# Patient Record
Sex: FEMALE | Race: WHITE | Employment: OTHER | URBAN - METROPOLITAN AREA
[De-identification: names, ages, dates, MRNs, and addresses within clinical notes are randomized per-mention and may not be internally consistent; named-entity substitution may affect disease eponyms.]

---

## 2017-07-17 ENCOUNTER — TELEPHONE (OUTPATIENT)
Dept: FAMILY MEDICINE | Facility: CLINIC | Age: 82
End: 2017-07-17

## 2017-07-17 NOTE — TELEPHONE ENCOUNTER
Patient is wanting to come in either today or tomorrow to see Jayy Storey for some on and off dizziness. Can someone please call her to get her scheduled? Thank you!

## 2017-07-18 ENCOUNTER — OFFICE VISIT (OUTPATIENT)
Dept: FAMILY MEDICINE | Facility: CLINIC | Age: 82
End: 2017-07-18
Payer: MEDICARE

## 2017-07-18 VITALS
SYSTOLIC BLOOD PRESSURE: 126 MMHG | OXYGEN SATURATION: 98 % | BODY MASS INDEX: 34.31 KG/M2 | WEIGHT: 178.6 LBS | HEART RATE: 73 BPM | DIASTOLIC BLOOD PRESSURE: 60 MMHG | TEMPERATURE: 98 F

## 2017-07-18 DIAGNOSIS — R42 DIZZINESS: Primary | ICD-10-CM

## 2017-07-18 DIAGNOSIS — E11.22 TYPE 2 DIABETES MELLITUS WITH STAGE 1 CHRONIC KIDNEY DISEASE, WITHOUT LONG-TERM CURRENT USE OF INSULIN (H): ICD-10-CM

## 2017-07-18 DIAGNOSIS — R06.02 SOB (SHORTNESS OF BREATH): ICD-10-CM

## 2017-07-18 DIAGNOSIS — R51.9 NONINTRACTABLE EPISODIC HEADACHE, UNSPECIFIED HEADACHE TYPE: ICD-10-CM

## 2017-07-18 DIAGNOSIS — N18.1 TYPE 2 DIABETES MELLITUS WITH STAGE 1 CHRONIC KIDNEY DISEASE, WITHOUT LONG-TERM CURRENT USE OF INSULIN (H): ICD-10-CM

## 2017-07-18 DIAGNOSIS — I10 BENIGN ESSENTIAL HYPERTENSION: ICD-10-CM

## 2017-07-18 LAB
ALBUMIN SERPL-MCNC: 3.5 G/DL (ref 3.4–5)
ALP SERPL-CCNC: 78 U/L (ref 40–150)
ALT SERPL W P-5'-P-CCNC: 28 U/L (ref 0–50)
ANION GAP SERPL CALCULATED.3IONS-SCNC: 7 MMOL/L (ref 3–14)
AST SERPL W P-5'-P-CCNC: 15 U/L (ref 0–45)
BILIRUB SERPL-MCNC: 0.3 MG/DL (ref 0.2–1.3)
BUN SERPL-MCNC: 25 MG/DL (ref 7–30)
CALCIUM SERPL-MCNC: 9.4 MG/DL (ref 8.5–10.1)
CHLORIDE SERPL-SCNC: 103 MMOL/L (ref 94–109)
CO2 SERPL-SCNC: 29 MMOL/L (ref 20–32)
CREAT SERPL-MCNC: 0.99 MG/DL (ref 0.52–1.04)
ERYTHROCYTE [DISTWIDTH] IN BLOOD BY AUTOMATED COUNT: 12.8 % (ref 10–15)
GFR SERPL CREATININE-BSD FRML MDRD: 54 ML/MIN/1.7M2
GLUCOSE SERPL-MCNC: 141 MG/DL (ref 70–99)
HBA1C MFR BLD: 6.7 % (ref 4.3–6)
HCT VFR BLD AUTO: 38.4 % (ref 35–47)
HGB BLD-MCNC: 12.7 G/DL (ref 11.7–15.7)
MCH RBC QN AUTO: 30.2 PG (ref 26.5–33)
MCHC RBC AUTO-ENTMCNC: 33.1 G/DL (ref 31.5–36.5)
MCV RBC AUTO: 91 FL (ref 78–100)
PLATELET # BLD AUTO: 336 10E9/L (ref 150–450)
POTASSIUM SERPL-SCNC: 4.7 MMOL/L (ref 3.4–5.3)
PROT SERPL-MCNC: 6.9 G/DL (ref 6.8–8.8)
RBC # BLD AUTO: 4.2 10E12/L (ref 3.8–5.2)
SODIUM SERPL-SCNC: 139 MMOL/L (ref 133–144)
WBC # BLD AUTO: 8.5 10E9/L (ref 4–11)

## 2017-07-18 PROCEDURE — 93000 ELECTROCARDIOGRAM COMPLETE: CPT | Performed by: PHYSICIAN ASSISTANT

## 2017-07-18 PROCEDURE — 36415 COLL VENOUS BLD VENIPUNCTURE: CPT | Performed by: PHYSICIAN ASSISTANT

## 2017-07-18 PROCEDURE — 99214 OFFICE O/P EST MOD 30 MIN: CPT | Performed by: PHYSICIAN ASSISTANT

## 2017-07-18 PROCEDURE — 80053 COMPREHEN METABOLIC PANEL: CPT | Performed by: PHYSICIAN ASSISTANT

## 2017-07-18 PROCEDURE — 83036 HEMOGLOBIN GLYCOSYLATED A1C: CPT | Performed by: PHYSICIAN ASSISTANT

## 2017-07-18 PROCEDURE — 85027 COMPLETE CBC AUTOMATED: CPT | Performed by: PHYSICIAN ASSISTANT

## 2017-07-18 NOTE — LETTER
[facility] (formerly [facility])[3]  [address]  [place] [address]  [phone]        July 19, 2017      Alka Azul  [address]  [place] [address]        Dear Alka,      Your lab work from yesterday came back with in normal limits.    Results for orders placed or performed in visit on 07/18/17   Comprehensive metabolic panel   Result Value Ref Range    Sodium 139 133 - 144 mmol/L    Potassium 4.7 3.4 - 5.3 mmol/L    Chloride 103 94 - 109 mmol/L    Carbon Dioxide 29 20 - 32 mmol/L    Anion Gap 7 3 - 14 mmol/L    Glucose 141 (H) 70 - 99 mg/dL    Urea Nitrogen 25 7 - 30 mg/dL    Creatinine 0.99 0.52 - 1.04 mg/dL    GFR Estimate 54 (L) >60 mL/min/1.7m2    GFR Estimate If Black 65 >60 mL/min/1.7m2    Calcium 9.4 8.5 - 10.1 mg/dL    Bilirubin Total 0.3 0.2 - 1.3 mg/dL    Albumin 3.5 3.4 - 5.0 g/dL    Protein Total 6.9 6.8 - 8.8 g/dL    Alkaline Phosphatase 78 40 - 150 U/L    ALT 28 0 - 50 U/L    AST 15 0 - 45 U/L   CBC with platelets   Result Value Ref Range    WBC 8.5 4.0 - 11.0 10e9/L    RBC Count 4.20 3.8 - 5.2 10e12/L    Hemoglobin 12.7 11.7 - 15.7 g/dL    Hematocrit 38.4 35.0 - 47.0 %    MCV 91 78 - 100 fl    MCH 30.2 26.5 - 33.0 pg    MCHC 33.1 31.5 - 36.5 g/dL    RDW 12.8 10.0 - 15.0 %    Platelet Count 336 150 - 450 10e9/L   Hemoglobin A1c   Result Value Ref Range    Hemoglobin A1C 6.7 (H) 4.3 - 6.0 %     If you have any questions or concerns, please call myself or my nurse at [phone].    Sincerely,    Jayy Storey PA-C/cindy

## 2017-07-18 NOTE — NURSING NOTE
"Chief Complaint   Patient presents with     Dizziness       Initial /66  Pulse 73  Temp 98  F (36.7  C) (Tympanic)  Wt 178 lb 9.6 oz (81 kg)  SpO2 98%  BMI 34.31 kg/m2 Estimated body mass index is 34.31 kg/(m^2) as calculated from the following:    Height as of 8/25/16: 5' 0.5\" (1.537 m).    Weight as of this encounter: 178 lb 9.6 oz (81 kg).  Medication Reconciliation: complete       Jaleesa Francisco CMA      "

## 2017-07-18 NOTE — MR AVS SNAPSHOT
"              After Visit Summary   7/18/2017    Alka Azul    MRN: [id number]           Patient Information     Date Of Birth          1935        Visit Information        Provider Department      7/18/2017 3:00 PM Jayy Storey PA-C [facility]        Today's Diagnoses     Dizziness    -  1    Nonintractable episodic headache, unspecified headache type        Benign essential hypertension        Type 2 diabetes mellitus with stage 1 chronic kidney disease, without long-term current use of insulin (H)        SOB (shortness of breath)           Follow-ups after your visit        Future tests that were ordered for you today     Open Future Orders        Priority Expected Expires Ordered    MR Brain w/o & w Contrast Routine  7/18/2018 7/18/2017    Echocardiogram Complete Routine  7/18/2018 7/18/2017    Zio Patch Holter Routine  9/1/2017 7/18/2017            Who to contact     Normal or non-critical lab and imaging results will be communicated to you by Webchutneyhart, letter or phone within 4 business days after the clinic has received the results. If you do not hear from us within 7 days, please contact the clinic through Webchutneyhart or phone. If you have a critical or abnormal lab result, we will notify you by phone as soon as possible.  Submit refill requests through A[employer] or call your pharmacy and they will forward the refill request to us. Please allow 3 business days for your refill to be completed.          If you need to speak with a  for additional information , please call: [phone]             Additional Information About Your Visit        WebchutneyharPlasmonix Information     Amonix lets you send messages to your doctor, view your test results, renew your prescriptions, schedule appointments and more. To sign up, go to [email or web address] . Click on \"Log in\" on the left side of the screen, which will take you to the Welcome page. Then click on \"Sign up Now\" on the right side " of the page.     You will be asked to enter the access code listed below, as well as some personal information. Please follow the directions to create your username and password.     Your access code is: [email or web address]  Expires: 10/16/2017  4:12 PM     Your access code will  in 90 days. If you need help or a new code, please call your [place] clinic or [phone].        Care EveryWhere ID     This is your Care EveryWhere ID. This could be used by other organizations to access your [place] medical records  [id number]        Your Vitals Were     Pulse Temperature Pulse Oximetry BMI (Body Mass Index)          73 98  F (36.7  C) (Tympanic) 98% 34.31 kg/m2         Blood Pressure from Last 3 Encounters:   17 126/60   16 128/72   16 144/70    Weight from Last 3 Encounters:   17 178 lb 9.6 oz (81 kg)   16 184 lb (83.5 kg)   16 188 lb (85.3 kg)              We Performed the Following     CBC with platelets     Comprehensive metabolic panel     EKG 12-lead complete w/read - Clinics     Hemoglobin A1c        Primary Care Provider Office Phone # Fax #    Jayy Storey PA-C [phone]       [facility] [address] [place] [facility] [address]        Equal Access to Services     [facility] : Hadii aad ku hadasho Soomaali, waaxda luqadaha, qaybta kaalmada adeegyada, rosalva tyson haypb brooks . So [facility] [phone].    ATENCIÓN: Si habla español, tiene a reddy disposición servicios gratuitos de asistencia lingüística. Tre al [phone].    We comply with applicable federal civil rights laws and [place] laws. We do not discriminate on the basis of race, color, national origin, age, disability sex, sexual orientation or gender identity.            Thank you!     Thank you for choosing [facility]  for your care. Our goal is always to provide you with excellent care. Hearing back from our patients is one way we can continue to improve  our services. Please take a few minutes to complete the written survey that you may receive in the mail after your visit with us. Thank you!             Your Updated Medication List - Protect others around you: Learn how to safely use, store and throw away your medicines at www.disposemymeds.org.          This list is accurate as of: 7/18/17  4:13 PM.  Always use your most recent med list.                   Brand Name Dispense Instructions for use Diagnosis    aspirin 81 MG tablet      Take 81 mg by mouth daily        celeBREX 200 MG capsule   Generic drug:  celecoxib      Take 200 mg by mouth daily        COZAAR 100 MG tablet   Generic drug:  losartan      Take 100 mg by mouth daily        diltiazem 180 MG 24 hr capsule    DILACOR XR     Take 180 mg by mouth daily        hydrochlorothiazide 12.5 MG Tabs tablet     90 tablet    Take 1 tablet (12.5 mg) by mouth daily    Benign essential hypertension       Levothyroxine Sodium 112 MCG Caps      Take 112 mcg by mouth daily        nexIUM 40 MG CR capsule   Generic drug:  esomeprazole      Take 40 mg by mouth every morning (before breakfast) Take 30-60 minutes before eating.        ranitidine 300 MG tablet    ZANTAC    30 tablet    Take 1 tablet (300 mg) by mouth At Bedtime    Gastroesophageal reflux disease without esophagitis       sertraline 25 MG tablet    ZOLOFT     Take 25 mg by mouth daily        TRADJENTA 5 MG Tabs tablet   Generic drug:  linagliptin      Take 5 mg by mouth daily        XANAX 0.25 MG tablet   Generic drug:  ALPRAZolam      Take 0.25 mg by mouth 3 times daily as needed for anxiety

## 2017-07-18 NOTE — PROGRESS NOTES
SUBJECTIVE:                                                    Alka Azul is a 81 year old female who presents to clinic today for the following health issues:      Dizziness  Onset: 1 week ago     Description:   Do you feel faint:  no   Does it feel like the surroundings (bed, room) are moving: no   Unsteady/off balance: YES  Have you passed out or fallen: YES    Intensity: moderate    Progression of Symptoms:  same    Accompanying Signs & Symptoms:  Heart palpitations: no   Nausea, vomiting: no   Weakness in arms or legs: no   Fatigue: no   Vision or speech changes: no   Ringing in ears (Tinnitus): no   Hearing Loss: no     History:   Head trauma/concussion hx: no   Previous similar symptoms: no   Recent bleeding history: no     Precipitating factors:   Worse with activity or head movement: no   Any new medications (BP?): no   Alcohol/drug abuse/withdrawal: no     Alleviating factors:   Does staying in a fixed position give relief:  no     Therapies Tried and outcome: NA    Some ha's as well. Generalized. Mild. No blurred or double vision. No hearing changes. No true vertigo. Minimal nasal congestion.  No dysphagia or dysarthria. No u/l paresthesias or paresis. No palpitations . No profound chest pain/sob.   Recent NM stress test in may was normal.   Problem list and histories reviewed & adjusted, as indicated.  Additional history: as documented    Patient Active Problem List   Diagnosis     Benign essential hypertension     Type 2 diabetes mellitus with stage 1 chronic kidney disease (H)     Hypothyroidism due to Hashimoto's thyroiditis     RAMONA (generalized anxiety disorder)     No past surgical history on file.    Social History   Substance Use Topics     Smoking status: Never Smoker     Smokeless tobacco: Not on file     Alcohol use No     No family history on file.      Recent Labs   Lab Test  08/25/16   1134  08/01/16   1507   A1C   --   6.9*   CR  0.97   --    GFRESTIMATED  56*   --    GFRESTBLACK  67    --    POTASSIUM  4.2   --    TSH   --   1.38      BP Readings from Last 3 Encounters:   07/18/17 112/66   08/25/16 128/72   08/01/16 144/70    Wt Readings from Last 3 Encounters:   07/18/17 178 lb 9.6 oz (81 kg)   08/25/16 184 lb (83.5 kg)   08/01/16 188 lb (85.3 kg)                    Reviewed and updated as needed this visit by clinical staff  Tobacco  Allergies  Meds       Reviewed and updated as needed this visit by Provider         All other systems negative except as outline above  OBJECTIVE:  Eye exam - right eye normal lid, conjunctiva, cornea, pupil and fundus, left eye normal lid, conjunctiva, cornea, pupil and fundus.  Thyroid not palpable, not enlarged, no nodules detected.  ENT exam reveals - ENT exam normal, no neck nodes or sinus tenderness.  CHEST:chest clear to IPPA, no tachypnea, retractions or cyanosis and S1, S2 normal, no murmur, no gallop, rate regular.  Her disks are flat. Pupils equal, round, reactive to light. Extraocular movements full. Visual fields full. Face moves symmetrically. Tongue midline. Hearing mildly decreased to finger-rubbing at approximately 6-8 inches. Neck without bruits. CV: S1, S2. Motor strength 5/5. Reflexes were 2/4. Toe signs were downgoing. Normal position sense. Good finger-nose-finger and fine finger movement. Gait: she karla from a chair without difficulty and has a mildly broad-based gait.  Negative orthostats  Alka was seen today for dizziness.    Diagnoses and all orders for this visit:    Dizziness  -     Comprehensive metabolic panel  -     CBC with platelets  -     EKG 12-lead complete w/read - Clinics  -     Zio Patch Holter; Future  -     MR Brain w/o & w Contrast; Future    Nonintractable episodic headache, unspecified headache type    Benign essential hypertension  -     Comprehensive metabolic panel    Type 2 diabetes mellitus with stage 1 chronic kidney disease, without long-term current use of insulin (H)  -     Hemoglobin A1c    SOB (shortness of  breath)  -     Echocardiogram Complete; Future    All other systems negative except as outline above  work on lifestyle modification

## 2017-07-19 ENCOUNTER — ALLIED HEALTH/NURSE VISIT (OUTPATIENT)
Dept: CARDIOLOGY | Facility: CLINIC | Age: 82
End: 2017-07-19
Attending: PHYSICIAN ASSISTANT
Payer: MEDICARE

## 2017-07-19 DIAGNOSIS — R42 DIZZINESS: ICD-10-CM

## 2017-07-19 PROCEDURE — 0298T ZZC EXT ECG > 48HR TO 21 DAY REVIEW AND INTERPRETATN: CPT

## 2017-07-19 PROCEDURE — 0296T ZIO PATCH HOLTER: CPT

## 2017-07-19 NOTE — PROGRESS NOTES
Patient has been prescribed a ZioPatch holter for 14 days.  Patient was instructed regarding the indication, function, care and prompt return of the ZioPatch holter monitor. The monitor, with S/N [id number],  was placed on the patient with instructions regarding care of the skin, electrodes, and monitor, as well as documentation in the patient diary. Patient demonstrated understanding of this information and agreed to call [employer] with further questions or concerns.

## 2017-08-02 ENCOUNTER — RADIANT APPOINTMENT (OUTPATIENT)
Dept: CARDIOLOGY | Facility: CLINIC | Age: 82
End: 2017-08-02
Attending: PHYSICIAN ASSISTANT
Payer: MEDICARE

## 2017-08-02 ENCOUNTER — RADIANT APPOINTMENT (OUTPATIENT)
Dept: MRI IMAGING | Facility: CLINIC | Age: 82
End: 2017-08-02
Attending: PHYSICIAN ASSISTANT
Payer: MEDICARE

## 2017-08-02 DIAGNOSIS — R06.02 SOB (SHORTNESS OF BREATH): ICD-10-CM

## 2017-08-02 DIAGNOSIS — R42 DIZZINESS: ICD-10-CM

## 2017-08-02 PROCEDURE — 70553 MRI BRAIN STEM W/O & W/DYE: CPT | Performed by: RADIOLOGY

## 2017-08-02 PROCEDURE — A9585 GADOBUTROL INJECTION: HCPCS | Performed by: RADIOLOGY

## 2017-08-02 PROCEDURE — 93306 TTE W/DOPPLER COMPLETE: CPT

## 2017-08-02 RX ORDER — GADOBUTROL 604.72 MG/ML
7.5 INJECTION INTRAVENOUS ONCE
Status: COMPLETED | OUTPATIENT
Start: 2017-08-02 | End: 2017-08-02

## 2017-08-02 RX ADMIN — GADOBUTROL 7.5 ML: 604.72 INJECTION INTRAVENOUS at 15:01

## 2017-08-08 ENCOUNTER — TELEPHONE (OUTPATIENT)
Dept: FAMILY MEDICINE | Facility: CLINIC | Age: 82
End: 2017-08-08

## 2017-08-08 NOTE — TELEPHONE ENCOUNTER
Patient calling regarding the recent testing she had done - holter monitor, echo-cardiogram, MRI patient would like to discuss the results with Jayy Storey and what the next step is going to be. Please call to advise. Thank you

## 2017-08-10 NOTE — TELEPHONE ENCOUNTER
Reason for Call:  Request for results:    Name of test or procedure: Zio Patch/MRI/Echo    Date of test of procedure:     Location of the test or procedure: [place] to leave the result message on voice mail or with a family member? YES    Phone number Patient can be reached at:  Home number on file [phone] (home)    Additional comments:     Call taken on 8/10/2017 at 4:18 PM by Jocelyn Marinelli

## 2017-08-11 NOTE — TELEPHONE ENCOUNTER
I don't see her holter monitor results in the computer. Verify that this was done. I'd like oracio to see me early next week for a recheck and to go over her recent test results.  Schedule her for an appt with me

## 2017-08-14 NOTE — TELEPHONE ENCOUNTER
Patient returned call, she will be in the area tomorrow. Patient asking if Jayy can see her tomorrow( add on)? Please advise and will call patient.

## 2017-08-15 ENCOUNTER — OFFICE VISIT (OUTPATIENT)
Dept: FAMILY MEDICINE | Facility: CLINIC | Age: 82
End: 2017-08-15
Payer: MEDICARE

## 2017-08-15 VITALS
TEMPERATURE: 98 F | RESPIRATION RATE: 16 BRPM | DIASTOLIC BLOOD PRESSURE: 68 MMHG | OXYGEN SATURATION: 96 % | SYSTOLIC BLOOD PRESSURE: 114 MMHG | BODY MASS INDEX: 34.58 KG/M2 | WEIGHT: 180 LBS | HEART RATE: 64 BPM

## 2017-08-15 DIAGNOSIS — R90.82 WHITE MATTER ABNORMALITY ON MRI OF BRAIN: ICD-10-CM

## 2017-08-15 DIAGNOSIS — R42 DIZZINESS: ICD-10-CM

## 2017-08-15 DIAGNOSIS — R06.09 DOE (DYSPNEA ON EXERTION): Primary | ICD-10-CM

## 2017-08-15 LAB — NT-PROBNP SERPL-MCNC: 104 PG/ML (ref 0–450)

## 2017-08-15 PROCEDURE — 99214 OFFICE O/P EST MOD 30 MIN: CPT | Performed by: PHYSICIAN ASSISTANT

## 2017-08-15 PROCEDURE — 36415 COLL VENOUS BLD VENIPUNCTURE: CPT | Performed by: PHYSICIAN ASSISTANT

## 2017-08-15 PROCEDURE — 83880 ASSAY OF NATRIURETIC PEPTIDE: CPT | Performed by: PHYSICIAN ASSISTANT

## 2017-08-15 NOTE — LETTER
Alka BALL [place]  [address] [address]        August 21, 2017          Dear MsDennise,    We are writing to inform you of your test results.    Your recent test for heart failure came back within normal limits.    Resulted Orders   BNP-N terminal pro   Result Value Ref Range    N-Terminal Pro Bnp 104 0 - 450 pg/mL      Comment:         Reference range shown and results flagged as abnormal are for the outpatient,   non acute settings. Establishing a baseline value for each individual patient   is useful for follow-up.  Suggested inpatient cut points for confirming diagnosis of CHF in an acute   setting are:   >450 pg/mL (age 18 to less than 50)   >900 pg/mL (age 50 to less than 75)   >1800 pg/mL (75 yrs and older)  An inpatient or emergency department NT-proPBNP <300 pg/mL effectively rules   out acute CHF, with 99% negative predictive value.          If you have any questions or concerns, please call the clinic at the number listed above.       Sincerely,        Jayy Storey PA-C/jose

## 2017-08-15 NOTE — TELEPHONE ENCOUNTER
Spoke with patient, appointment scheduled to see Jayy 8/15/17 at 140 pm. Did inform patient may be a little wait as she is being added on. Patient understood

## 2017-08-15 NOTE — PROGRESS NOTES
SUBJECTIVE:                                                    Alka Azul is a 81 year old female who presents to clinic today for the following health issues:      Heart Problem-follow up imaging and holter monitor  Onset: 2+ months    Description:       Intensity: mild    Progression of Symptoms:  waxing and waning    Accompanying Signs & Symptoms:        Therapies Tried and outcome:       DHALIWAL. No pnd or orthopnea. Some intermittent edema      Problem list and histories reviewed & adjusted, as indicated.  Additional history: as documented        Reviewed and updated as needed this visit by clinical staffMeds       Reviewed and updated as needed this visit by Provider         All other systems negative except as outline above  OBJECTIVE:    Eye exam - right eye normal lid, conjunctiva, cornea, pupil and fundus, left eye normal lid, conjunctiva, cornea, pupil and fundus.  ENT exam reveals - ENT exam normal, no neck nodes or sinus tenderness.  CHEST:chest clear to IPPA, no tachypnea, retractions or cyanosis and S1, S2 normal, no murmur, no gallop, rate regular.  Thyroid not palpable, not enlarged, no nodules detected.  Her disks are flat. Pupils equal, round, reactive to light. Extraocular movements full. Visual fields full. Face moves symmetrically. Tongue midline. Hearing mildly decreased to finger-rubbing at approximately 6-8 inches. Neck without bruits. CV: S1, S2. Motor strength 5/5. Reflexes were 2/4. Toe signs were downgoing. Normal position sense. Good finger-nose-finger and fine finger movement. Gait: she karla from a chair without difficulty and has a mildly broad-based gait.  Trace of leg edema         Alka was seen today for heart problem.    Diagnoses and all orders for this visit:    DHALIWAL (dyspnea on exertion)  -     BNP-N terminal pro  -     CARDIOLOGY EVAL ADULT REFERRAL    White matter abnormality on MRI of brain  -     NEUROLOGY ADULT REFERRAL    Dizziness  -     NEUROLOGY ADULT REFERRAL  -      CARDIOLOGY EVAL ADULT REFERRAL      Advised supportive and symptomatic treatment.  Follow up with Provider - if condition persists or worsens.   work on lifestyle modification

## 2017-08-15 NOTE — MR AVS SNAPSHOT
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  Call member services at your health plan with any benefit or coverage questions.      Type of Referral:  New Cardiology Consult    Timeframe requested:  Within 1 month    Please bring the following to your appointment:  >>   Any x-rays, CTs or MRIs which have been performed.  Contact the facility where they were done to arrange for  prior to your scheduled appointment.    >>   List of current medications  >>   This referral request   >>   Any documents/labs given to you for this referral            NEUROLOGY ADULT REFERRAL       Your provider has referred you for the following:   Consult at [facility]: [facility] [place] [facility] [place] [phone]   [email or web address]    Please be aware that coverage of these services is subject to the terms and limitations of your health insurance plan.  Call member services at your health plan with any benefit or coverage questions.      Please bring the following with you to your appointment:    (1) Any X-Rays, CTs or MRIs which have been performed.  Contact the facility where they were done to arrange for  prior to your scheduled appointment.    (2) List of current  "medications  (3) This referral request   (4) Any documents/labs given to you for this referral                  Who to contact     Normal or non-critical lab and imaging results will be communicated to you by Prehash Ltdhart, letter or phone within 4 business days after the clinic has received the results. If you do not hear from us within 7 days, please contact the clinic through Prehash Ltdhart or phone. If you have a critical or abnormal lab result, we will notify you by phone as soon as possible.  Submit refill requests through [employer] or call your pharmacy and they will forward the refill request to us. Please allow 3 business days for your refill to be completed.          If you need to speak with a  for additional information , please call: [phone]             Additional Information About Your Visit        [employer] Information     [employer] lets you send messages to your doctor, view your test results, renew your prescriptions, schedule appointments and more. To sign up, go to [email or web address] . Click on \"Log in\" on the left side of the screen, which will take you to the Welcome page. Then click on \"Sign up Now\" on the right side of the page.     You will be asked to enter the access code listed below, as well as some personal information. Please follow the directions to create your username and password.     Your access code is: [email or web address]  Expires: 10/16/2017  4:12 PM     Your access code will  in 90 days. If you need help or a new code, please call your [place] clinic or [phone].        Care EveryWhere ID     This is your Care EveryWhere ID. This could be used by other organizations to access your [place] medical records  [id number]        Your Vitals Were     Pulse Temperature Respirations Pulse Oximetry BMI (Body Mass Index)       64 98  F (36.7  C) (Tympanic) 16 96% 34.58 kg/m2        Blood Pressure from Last 3 Encounters:   08/15/17 114/68   17 126/60   16 128/72 "    Weight from Last 3 Encounters:   08/15/17 180 lb (81.6 kg)   07/18/17 178 lb 9.6 oz (81 kg)   08/25/16 184 lb (83.5 kg)              We Performed the Following     BNP-N terminal pro     CARDIOLOGY EVAL ADULT REFERRAL     NEUROLOGY ADULT REFERRAL        Primary Care Provider Office Phone # Fax #    Jayysandra Storey PA-C [phone]       [address] [facility] [address]        Equal Access to Services     [facility]: Hadii aad ku hadasho Soomaali, waaxda luqadaha, qaybta kaalmada adeegyada, waxay idiin hayaan adeeg kharash la'pb . So [facility] [phone].    ATENCIÓN: Si habla español, tiene a reddy disposición servicios gratuitos de asistencia lingüística. [facility] [phone].    We comply with applicable federal civil rights laws and [place] laws. We do not discriminate on the basis of race, color, national origin, age, disability sex, sexual orientation or gender identity.            Thank you!     Thank you for choosing [facility]  for your care. Our goal is always to provide you with excellent care. Hearing back from our patients is one way we can continue to improve our services. Please take a few minutes to complete the written survey that you may receive in the mail after your visit with us. Thank you!             Your Updated Medication List - Protect others around you: Learn how to safely use, store and throw away your medicines at www.disposemymeds.org.          This list is accurate as of: 8/15/17  2:38 PM.  Always use your most recent med list.                   Brand Name Dispense Instructions for use Diagnosis    aspirin 81 MG tablet      Take 81 mg by mouth daily        celeBREX 200 MG capsule   Generic drug:  celecoxib      Take 200 mg by mouth daily        COZAAR 100 MG tablet   Generic drug:  losartan      Take 100 mg by mouth daily        diltiazem 180 MG 24 hr capsule    DILACOR XR     Take 180 mg by mouth daily        hydrochlorothiazide 12.5 MG Tabs  tablet     90 tablet    Take 1 tablet (12.5 mg) by mouth daily    Benign essential hypertension       Levothyroxine Sodium 112 MCG Caps      Take 112 mcg by mouth daily        nexIUM 40 MG CR capsule   Generic drug:  esomeprazole      Take 40 mg by mouth every morning (before breakfast) Take 30-60 minutes before eating.        ranitidine 300 MG tablet    ZANTAC    30 tablet    Take 1 tablet (300 mg) by mouth At Bedtime    Gastroesophageal reflux disease without esophagitis       sertraline 25 MG tablet    ZOLOFT     Take 25 mg by mouth daily        TRADJENTA 5 MG Tabs tablet   Generic drug:  linagliptin      Take 5 mg by mouth daily        XANAX 0.25 MG tablet   Generic drug:  ALPRAZolam      Take 0.25 mg by mouth 3 times daily as needed for anxiety

## 2017-08-16 ENCOUNTER — TELEPHONE (OUTPATIENT)
Dept: FAMILY MEDICINE | Facility: CLINIC | Age: 82
End: 2017-08-16

## 2017-08-16 NOTE — TELEPHONE ENCOUNTER
Patient calling, she would like results/reports for all of the recent testing she has had done, including MRI, holter monitor, EKG, labs etc.     Her current mailing address is:    [address] [place] [address]  [id number]  [place] [address]

## 2019-07-30 ENCOUNTER — OFFICE VISIT (OUTPATIENT)
Dept: FAMILY MEDICINE | Facility: CLINIC | Age: 84
End: 2019-07-30
Payer: MEDICARE

## 2019-07-30 VITALS
DIASTOLIC BLOOD PRESSURE: 68 MMHG | WEIGHT: 180 LBS | BODY MASS INDEX: 35.34 KG/M2 | SYSTOLIC BLOOD PRESSURE: 131 MMHG | HEART RATE: 77 BPM | HEIGHT: 60 IN | RESPIRATION RATE: 18 BRPM | OXYGEN SATURATION: 96 %

## 2019-07-30 DIAGNOSIS — E06.3 HYPOTHYROIDISM DUE TO HASHIMOTO'S THYROIDITIS: ICD-10-CM

## 2019-07-30 DIAGNOSIS — Z01.818 PREOP GENERAL PHYSICAL EXAM: Primary | ICD-10-CM

## 2019-07-30 DIAGNOSIS — E66.01 MORBID OBESITY (H): ICD-10-CM

## 2019-07-30 DIAGNOSIS — H25.89 OTHER AGE-RELATED CATARACT OF BOTH EYES: ICD-10-CM

## 2019-07-30 DIAGNOSIS — E11.22 TYPE 2 DIABETES MELLITUS WITH STAGE 3 CHRONIC KIDNEY DISEASE, WITHOUT LONG-TERM CURRENT USE OF INSULIN (H): ICD-10-CM

## 2019-07-30 DIAGNOSIS — I10 BENIGN ESSENTIAL HYPERTENSION: ICD-10-CM

## 2019-07-30 DIAGNOSIS — N18.30 TYPE 2 DIABETES MELLITUS WITH STAGE 3 CHRONIC KIDNEY DISEASE, WITHOUT LONG-TERM CURRENT USE OF INSULIN (H): ICD-10-CM

## 2019-07-30 LAB
ANION GAP SERPL CALCULATED.3IONS-SCNC: 6 MMOL/L (ref 3–14)
BUN SERPL-MCNC: 32 MG/DL (ref 7–30)
CALCIUM SERPL-MCNC: 9.6 MG/DL (ref 8.5–10.1)
CHLORIDE SERPL-SCNC: 104 MMOL/L (ref 94–109)
CO2 SERPL-SCNC: 28 MMOL/L (ref 20–32)
CREAT SERPL-MCNC: 1 MG/DL (ref 0.52–1.04)
GFR SERPL CREATININE-BSD FRML MDRD: 53 ML/MIN/{1.73_M2}
GLUCOSE SERPL-MCNC: 163 MG/DL (ref 70–99)
HBA1C MFR BLD: 7.4 % (ref 0–5.6)
POTASSIUM SERPL-SCNC: 4.5 MMOL/L (ref 3.4–5.3)
SODIUM SERPL-SCNC: 138 MMOL/L (ref 133–144)

## 2019-07-30 PROCEDURE — 83036 HEMOGLOBIN GLYCOSYLATED A1C: CPT | Performed by: PHYSICIAN ASSISTANT

## 2019-07-30 PROCEDURE — 36415 COLL VENOUS BLD VENIPUNCTURE: CPT | Performed by: PHYSICIAN ASSISTANT

## 2019-07-30 PROCEDURE — 80048 BASIC METABOLIC PNL TOTAL CA: CPT | Performed by: PHYSICIAN ASSISTANT

## 2019-07-30 PROCEDURE — 99215 OFFICE O/P EST HI 40 MIN: CPT | Performed by: PHYSICIAN ASSISTANT

## 2019-07-30 RX ORDER — OLMESARTAN MEDOXOMIL 20 MG/1
20 TABLET ORAL DAILY
COMMUNITY

## 2019-07-30 RX ORDER — PANTOPRAZOLE SODIUM 40 MG/1
1 FOR SUSPENSION ORAL DAILY
COMMUNITY

## 2019-07-30 ASSESSMENT — MIFFLIN-ST. JEOR: SCORE: 1202.97

## 2019-07-30 NOTE — LETTER
August 27, 2019      Alka Azul  [address] [facility] [address]        Dear Alka,     Your recent preop lab work included an update of your a1c. It did climb to over 7 (7.4 to be exact). To help lower this, I'd like for you to really work on a lower starch/sugar diet and work on getting more consistent low intensity exercise. I'd like this number rechecked in 3-4 months.     The rest of your lab work came back nice and stable.       Resulted Orders   Hemoglobin A1c   Result Value Ref Range    Hemoglobin A1C 7.4 (H) 0 - 5.6 %      Comment:      Normal <5.7% Prediabetes 5.7-6.4%  Diabetes 6.5% or higher - adopted from ADA   consensus guidelines.     Basic metabolic panel  (Ca, Cl, CO2, Creat, Gluc, K, Na, BUN)   Result Value Ref Range    Sodium 138 133 - 144 mmol/L    Potassium 4.5 3.4 - 5.3 mmol/L    Chloride 104 94 - 109 mmol/L    Carbon Dioxide 28 20 - 32 mmol/L    Anion Gap 6 3 - 14 mmol/L    Glucose 163 (H) 70 - 99 mg/dL    Urea Nitrogen 32 (H) 7 - 30 mg/dL    Creatinine 1.00 0.52 - 1.04 mg/dL    GFR Estimate 53 (L) >60 mL/min/[1.73_m2]      Comment:      Non  GFR Calc  Starting 12/18/2018, serum creatinine based estimated GFR (eGFR) will be   calculated using the Chronic Kidney Disease Epidemiology Collaboration   (CKD-EPI) equation.      GFR Estimate If Black 61 >60 mL/min/[1.73_m2]      Comment:       GFR Calc  Starting 12/18/2018, serum creatinine based estimated GFR (eGFR) will be   calculated using the Chronic Kidney Disease Epidemiology Collaboration   (CKD-EPI) equation.      Calcium 9.6 8.5 - 10.1 mg/dL       If you have any questions or concerns, please call the clinic at the number listed above.       Sincerely,    Jayy Storey PA-C/jose

## 2019-07-30 NOTE — PROGRESS NOTES
[facility] [place]  [address]  [place] [address]  [phone]  Dept: [phone]    PRE-OP EVALUATION:  Today's date: 2019    Alka Azul (: 1935) presents for pre-operative evaluation assessment as requested by Dr. Webster.  She requires evaluation and anesthesia risk assessment prior to undergoing surgery/procedure for treatment of cataract .    Proposed Surgery/ Procedure: cataract removal right eye  Date of Surgery/ Procedure: 19  Time of Surgery/ Procedure: 730am  Hospital/Surgical Facility:   Fax number for surgical facility:   Primary Physician: Jayy Storey  Type of Anesthesia Anticipated: to be determined    Patient has a Health Care Directive or Living Will:  YES     1. NO - Do you have a history of heart attack, stroke, stent, bypass or surgery on an artery in the head, neck, heart or legs?  2. NO - Do you ever have any pain or discomfort in your chest?  3. NO - Do you have a history of  Heart Failure?  4. NO - Are you troubled by shortness of breath when: walking on the level, up a slight hill or at night?  5. NO - Do you currently have a cold, bronchitis or other respiratory infection?  6. NO - Do you have a cough, shortness of breath or wheezing?  7. NO - Do you sometimes get pains in the calves of your legs when you walk?  8. NO - Do you or anyone in your family have previous history of blood clots?  9. NO - Do you or does anyone in your family have a serious bleeding problem such as prolonged bleeding following surgeries or cuts?  10. NO - Have you ever had problems with anemia or been told to take iron pills?  11. NO - Have you had any abnormal blood loss such as black, tarry or bloody stools, or abnormal vaginal bleeding?  12. NO - Have you ever had a blood transfusion?  13. NO - Have you or any of your relatives ever had problems with anesthesia?  14. YES - DO YOU HAVE SLEEP APNEA, EXCESSIVE SNORING OR DAYTIME DROWSINESS? Sleep apnea  15. NO - Do  you have any prosthetic heart valves?  16. YES - DO YOU HAVE PROSTHETIC JOINTS?   17. NO - Is there any chance that you may be pregnant?      HPI:     HPI related to upcoming procedure: bilateral cataracts with alterations in vision.      DIABETES - Patient has a longstanding history of DiabetesType Type II . Patient is being treated with oral agents and denies significant side effects. Control has been good. Complicating factors include but are not limited to: hypertension.     HYPERTENSION - Patient has longstanding history of HTN , currently denies any symptoms referable to elevated blood pressure. Specifically denies chest pain, palpitations, dyspnea, orthopnea, PND or peripheral edema. Blood pressure readings have been in normal range. Current medication regimen is as listed below. Patient denies any side effects of medication.     HYPOTHYROIDISM - Patient has a longstanding history of chronic Hypothyroidism. Patient has been doing well, noting no tremor, insomnia, hair loss or changes in skin texture. Continues to take medications as directed, without adverse reactions or side effects. Last TSH   Lab Results   Component Value Date    TSH 1.38 08/01/2016   .        MEDICAL HISTORY:     Patient Active Problem List    Diagnosis Date Noted     Obesity (BMI 35.0-39.9) with comorbidity (H) 07/30/2019     Priority: Medium     Type 2 diabetes mellitus with stage 3 chronic kidney disease, without long-term current use of insulin (H) 07/30/2019     Priority: Medium     Benign essential hypertension 08/01/2016     Priority: Medium     Type 2 diabetes mellitus with stage 1 chronic kidney disease (H) 08/01/2016     Priority: Medium     Hypothyroidism due to Hashimoto's thyroiditis 08/01/2016     Priority: Medium     RAMONA (generalized anxiety disorder) 08/01/2016     Priority: Medium      History reviewed. No pertinent past medical history.  History reviewed. No pertinent surgical history.  Current Outpatient Medications    Medication Sig Dispense Refill     ALPRAZolam (XANAX) 0.25 MG tablet Take 0.25 mg by mouth 3 times daily as needed for anxiety       celecoxib (CELEBREX) 200 MG capsule Take 200 mg by mouth daily       diltiazem (DILACOR XR) 180 MG 24 hr ER capsule Take 180 mg by mouth daily       hydrochlorothiazide 12.5 MG TABS Take 1 tablet (12.5 mg) by mouth daily 90 tablet 3     Levothyroxine Sodium 112 MCG CAPS Take 112 mcg by mouth daily       linagliptin (TRADJENTA) 5 MG TABS tablet Take 5 mg by mouth daily       olmesartan (BENICAR) 20 MG tablet Take 20 mg by mouth daily       pantoprazole sodium (PROTONIX) 40 MG packet Take 1 packet by mouth daily       sertraline (ZOLOFT) 25 MG tablet Take 25 mg by mouth daily       OTC products: None, except as noted above    Allergies   Allergen Reactions     Hmg-Coa-R Inhibitors Cramps      Latex Allergy: NO    Social History     Tobacco Use     Smoking status: Never Smoker     Smokeless tobacco: Never Used   Substance Use Topics     Alcohol use: No     History   Drug Use No       REVIEW OF SYSTEMS:   Constitutional, neuro, ENT, endocrine, pulmonary, cardiac, gastrointestinal, genitourinary, musculoskeletal, integument and psychiatric systems are negative, except as otherwise noted.    EXAM:   /68   Pulse 77   Resp 18   Ht 1.524 m (5')   Wt 81.6 kg (180 lb)   SpO2 96%   BMI 35.15 kg/m      GENERAL APPEARANCE: healthy, alert and no distress     EYES: Eyes grossly normal to inspection and bilateral cataracts      HENT: ear canals and TM's normal and nose and mouth without ulcers or lesions     NECK: no adenopathy, no asymmetry, masses, or scars and thyroid normal to palpation     RESP: lungs clear to auscultation - no rales, rhonchi or wheezes     CV: regular rates and rhythm, normal S1 S2, no S3 or S4 and no murmur, click or rub     ABDOMEN:  soft, nontender, no HSM or masses and bowel sounds normal     MS: extremities normal- no gross deformities noted, no evidence of  inflammation in joints, FROM in all extremities.     SKIN: no suspicious lesions or rashes     NEURO: Normal strength and tone, sensory exam grossly normal, mentation intact and speech normal     PSYCH: mentation appears normal. and affect normal/bright     LYMPHATICS: No cervical adenopathy  Foot exam - both sides normal; no swelling, tenderness or skin or vascular lesions. Color and temperature is normal. Sensation is intact. Peripheral pulses are palpable. Toenails are normal.    DIAGNOSTICS:   No labs or EKG required for low risk surgery (cataract, skin procedure, breast biopsy, etc)    Recent Labs   Lab Test 07/18/17  1539 08/25/16  1134 08/01/16  1507   HGB 12.7  --   --      --   --     136  --    POTASSIUM 4.7 4.2  --    CR 0.99 0.97  --    A1C 6.7*  --  6.9*        IMPRESSION:   Alka was seen today for pre-op exam.    Diagnoses and all orders for this visit:    Preop general physical exam    Other age-related cataract of both eyes    Hypothyroidism due to Hashimoto's thyroiditis    Benign essential hypertension  -     Basic metabolic panel  (Ca, Cl, CO2, Creat, Gluc, K, Na, BUN)    Morbid obesity (H)    Type 2 diabetes mellitus with stage 3 chronic kidney disease, without long-term current use of insulin (H)  -     Hemoglobin A1c          The proposed surgical procedure is considered LOW risk.    REVISED CARDIAC RISK INDEX  The patient has the following serious cardiovascular risks for perioperative complications such as (MI, PE, VFib and 3  AV Block):  No serious cardiac risks  INTERPRETATION: 2 risks: Class III (moderate risk - 6.6% complication rate)    The patient has the following additional risks for perioperative complications:  No identified additional risks      ICD-10-CM    1. Preop general physical exam Z01.818    2. Other age-related cataract of both eyes H25.89    3. Hypothyroidism due to Hashimoto's thyroiditis E03.8     E06.3    4. Benign essential hypertension I10 Basic  metabolic panel  (Ca, Cl, CO2, Creat, Gluc, K, Na, BUN)   5. Morbid obesity (H) E66.01    6. Type 2 diabetes mellitus with stage 3 chronic kidney disease, without long-term current use of insulin (H) E11.22 Hemoglobin A1c    N18.3        RECOMMENDATIONS:     Hold all meds on the morning of procedure.  Hold celebrex 5-7 days prior to procedure.        APPROVAL GIVEN to proceed with proposed procedure, without further diagnostic evaluation       Signed Electronically by: Jayy Storey PA-C    Copy of this evaluation report is provided to requesting physician.    [facility] Preop Guidelines    Revised Cardiac Risk Index

## 2019-08-05 ENCOUNTER — TRANSFERRED RECORDS (OUTPATIENT)
Dept: HEALTH INFORMATION MANAGEMENT | Facility: CLINIC | Age: 84
End: 2019-08-05

## 2019-08-19 ENCOUNTER — TRANSFERRED RECORDS (OUTPATIENT)
Dept: HEALTH INFORMATION MANAGEMENT | Facility: CLINIC | Age: 84
End: 2019-08-19

## 2021-06-24 ENCOUNTER — TELEPHONE (OUTPATIENT)
Dept: FAMILY MEDICINE | Facility: CLINIC | Age: 86
End: 2021-06-24

## 2021-06-24 ENCOUNTER — ANCILLARY PROCEDURE (OUTPATIENT)
Dept: CT IMAGING | Facility: CLINIC | Age: 86
End: 2021-06-24
Attending: PHYSICIAN ASSISTANT
Payer: MEDICARE

## 2021-06-24 ENCOUNTER — OFFICE VISIT (OUTPATIENT)
Dept: FAMILY MEDICINE | Facility: CLINIC | Age: 86
End: 2021-06-24
Payer: MEDICARE

## 2021-06-24 VITALS
TEMPERATURE: 98 F | BODY MASS INDEX: 34.37 KG/M2 | DIASTOLIC BLOOD PRESSURE: 67 MMHG | OXYGEN SATURATION: 95 % | SYSTOLIC BLOOD PRESSURE: 112 MMHG | WEIGHT: 176 LBS | HEART RATE: 69 BPM | RESPIRATION RATE: 20 BRPM

## 2021-06-24 DIAGNOSIS — E11.22 TYPE 2 DIABETES MELLITUS WITH STAGE 3A CHRONIC KIDNEY DISEASE, WITHOUT LONG-TERM CURRENT USE OF INSULIN (H): ICD-10-CM

## 2021-06-24 DIAGNOSIS — R10.84 ABDOMINAL PAIN, GENERALIZED: ICD-10-CM

## 2021-06-24 DIAGNOSIS — Z13.220 SCREENING FOR HYPERLIPIDEMIA: Primary | ICD-10-CM

## 2021-06-24 DIAGNOSIS — Z78.0 POSTMENOPAUSAL STATUS: ICD-10-CM

## 2021-06-24 DIAGNOSIS — N18.31 TYPE 2 DIABETES MELLITUS WITH STAGE 3A CHRONIC KIDNEY DISEASE, WITHOUT LONG-TERM CURRENT USE OF INSULIN (H): ICD-10-CM

## 2021-06-24 DIAGNOSIS — E06.3 HYPOTHYROIDISM DUE TO HASHIMOTO'S THYROIDITIS: ICD-10-CM

## 2021-06-24 DIAGNOSIS — R19.00 ABDOMINAL MASS, UNSPECIFIED ABDOMINAL LOCATION: ICD-10-CM

## 2021-06-24 LAB
ALBUMIN SERPL-MCNC: 3.2 G/DL (ref 3.4–5)
ALBUMIN UR-MCNC: NEGATIVE MG/DL
ALP SERPL-CCNC: 78 U/L (ref 40–150)
ALT SERPL W P-5'-P-CCNC: 20 U/L (ref 0–50)
ANION GAP SERPL CALCULATED.3IONS-SCNC: 5 MMOL/L (ref 3–14)
APPEARANCE UR: CLEAR
AST SERPL W P-5'-P-CCNC: 17 U/L (ref 0–45)
BACTERIA #/AREA URNS HPF: ABNORMAL /HPF
BASOPHILS # BLD AUTO: 0 10E9/L (ref 0–0.2)
BASOPHILS NFR BLD AUTO: 0.2 %
BILIRUB SERPL-MCNC: 0.3 MG/DL (ref 0.2–1.3)
BILIRUB UR QL STRIP: NEGATIVE
BUN SERPL-MCNC: 17 MG/DL (ref 7–30)
CALCIUM SERPL-MCNC: 9.1 MG/DL (ref 8.5–10.1)
CHLORIDE SERPL-SCNC: 105 MMOL/L (ref 94–109)
CO2 SERPL-SCNC: 26 MMOL/L (ref 20–32)
COLOR UR AUTO: YELLOW
CREAT BLD-MCNC: 1.1 MG/DL (ref 0.5–1.2)
CREAT SERPL-MCNC: 0.96 MG/DL (ref 0.52–1.04)
CREAT UR-MCNC: 156 MG/DL
DIFFERENTIAL METHOD BLD: NORMAL
EOSINOPHIL # BLD AUTO: 0.1 10E9/L (ref 0–0.7)
EOSINOPHIL NFR BLD AUTO: 0.6 %
ERYTHROCYTE [DISTWIDTH] IN BLOOD BY AUTOMATED COUNT: 13.4 % (ref 10–15)
GFR SERPL CREATININE-BSD FRML MDRD: 46 ML/MIN/{1.73_M2}
GFR SERPL CREATININE-BSD FRML MDRD: 54 ML/MIN/{1.73_M2}
GFRB: 47
GLUCOSE SERPL-MCNC: 145 MG/DL (ref 70–99)
GLUCOSE UR STRIP-MCNC: NEGATIVE MG/DL
HBA1C MFR BLD: 7.1 % (ref 0–5.6)
HCT VFR BLD AUTO: 37.7 % (ref 35–47)
HGB BLD-MCNC: 12.1 G/DL (ref 11.7–15.7)
HGB UR QL STRIP: NEGATIVE
KETONES UR STRIP-MCNC: NEGATIVE MG/DL
LEUKOCYTE ESTERASE UR QL STRIP: ABNORMAL
LIPASE SERPL-CCNC: 90 U/L (ref 73–393)
LYMPHOCYTES # BLD AUTO: 1.5 10E9/L (ref 0.8–5.3)
LYMPHOCYTES NFR BLD AUTO: 15.8 %
MCH RBC QN AUTO: 28.2 PG (ref 26.5–33)
MCHC RBC AUTO-ENTMCNC: 32.1 G/DL (ref 31.5–36.5)
MCV RBC AUTO: 88 FL (ref 78–100)
MICROALBUMIN UR-MCNC: 6 MG/L
MICROALBUMIN/CREAT UR: 3.92 MG/G CR (ref 0–25)
MONOCYTES # BLD AUTO: 1.2 10E9/L (ref 0–1.3)
MONOCYTES NFR BLD AUTO: 13.2 %
NEUTROPHILS # BLD AUTO: 6.6 10E9/L (ref 1.6–8.3)
NEUTROPHILS NFR BLD AUTO: 70.2 %
NITRATE UR QL: NEGATIVE
NON-SQ EPI CELLS #/AREA URNS LPF: ABNORMAL /LPF
PH UR STRIP: 6 PH (ref 5–7)
PLATELET # BLD AUTO: 387 10E9/L (ref 150–450)
POTASSIUM SERPL-SCNC: 5 MMOL/L (ref 3.4–5.3)
PROT SERPL-MCNC: 6.7 G/DL (ref 6.8–8.8)
RBC # BLD AUTO: 4.29 10E12/L (ref 3.8–5.2)
RBC #/AREA URNS AUTO: ABNORMAL /HPF
SODIUM SERPL-SCNC: 136 MMOL/L (ref 133–144)
SOURCE: ABNORMAL
SP GR UR STRIP: 1.02 (ref 1–1.03)
TSH SERPL DL<=0.005 MIU/L-ACNC: 2.28 MU/L (ref 0.4–4)
UROBILINOGEN UR STRIP-ACNC: 0.2 EU/DL (ref 0.2–1)
WBC # BLD AUTO: 9.4 10E9/L (ref 4–11)
WBC #/AREA URNS AUTO: ABNORMAL /HPF

## 2021-06-24 PROCEDURE — 85025 COMPLETE CBC W/AUTO DIFF WBC: CPT | Performed by: PHYSICIAN ASSISTANT

## 2021-06-24 PROCEDURE — 99214 OFFICE O/P EST MOD 30 MIN: CPT | Performed by: PHYSICIAN ASSISTANT

## 2021-06-24 PROCEDURE — 99207 PR FOOT EXAM NO CHARGE: CPT | Performed by: PHYSICIAN ASSISTANT

## 2021-06-24 PROCEDURE — G1004 CDSM NDSC: HCPCS | Mod: TC | Performed by: RADIOLOGY

## 2021-06-24 PROCEDURE — 36415 COLL VENOUS BLD VENIPUNCTURE: CPT | Performed by: PHYSICIAN ASSISTANT

## 2021-06-24 PROCEDURE — 84443 ASSAY THYROID STIM HORMONE: CPT | Performed by: PHYSICIAN ASSISTANT

## 2021-06-24 PROCEDURE — 83036 HEMOGLOBIN GLYCOSYLATED A1C: CPT | Performed by: PHYSICIAN ASSISTANT

## 2021-06-24 PROCEDURE — 74177 CT ABD & PELVIS W/CONTRAST: CPT | Mod: TC | Performed by: RADIOLOGY

## 2021-06-24 PROCEDURE — 81001 URINALYSIS AUTO W/SCOPE: CPT | Performed by: PHYSICIAN ASSISTANT

## 2021-06-24 PROCEDURE — 80053 COMPREHEN METABOLIC PANEL: CPT | Performed by: PHYSICIAN ASSISTANT

## 2021-06-24 PROCEDURE — 82043 UR ALBUMIN QUANTITATIVE: CPT | Performed by: PHYSICIAN ASSISTANT

## 2021-06-24 PROCEDURE — 83690 ASSAY OF LIPASE: CPT | Performed by: PHYSICIAN ASSISTANT

## 2021-06-24 RX ORDER — IOPAMIDOL 755 MG/ML
99 INJECTION, SOLUTION INTRAVASCULAR ONCE
Status: COMPLETED | OUTPATIENT
Start: 2021-06-24 | End: 2021-06-24

## 2021-06-24 RX ADMIN — IOPAMIDOL 99 ML: 755 INJECTION, SOLUTION INTRAVASCULAR at 12:14

## 2021-06-24 NOTE — TELEPHONE ENCOUNTER
Patient notified of results and has been referred to oncology for further evaluation and treatment

## 2021-06-24 NOTE — TELEPHONE ENCOUNTER
Dr Berrios calling regarding CT that was just done. 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 He wanted to personally let the provider know so provider could reach out to the patient.    Routed to provider.     Thank you,  Raquel Willoughby RN

## 2021-06-24 NOTE — PROGRESS NOTES
Subjective   Alka is a 85 year old who presents for the following health issues    HPI     Abdominal/Flank Pain  Onset/Duration: 1 month  Description:   Character: Dull ache  Location: whole abdomen  Radiation: None  Intensity: varies  Progression of Symptoms:  Some days are better than others and constant  Accompanying Signs & Symptoms:  Fever/Chills: no  Gas/Bloating: YES  Nausea: YES  Vomitting: no  Diarrhea: YES  Constipation: no  Dysuria or Hematuria: no  History:   Trauma: no  Previous similar pain: no  Previous tests done: none  Precipitating factors:   Does the pain change with:     Food: no    Bowel Movement: no    Urination: no   Other factors:  Vertigo, heart races  Therapies tried and outcome: watching what she is eating, has a hiatal hernia  No LMP recorded. Patient is postmenopausal.    Recheck of her diabetes.   Watching her diet some. Could do better. Some polydipsia.   Denies chest pain. Occasional mild DHALIWAL.   Generalized abd pain. Constant. Symptoms x 3-4 wks. Some nausea. No vomiting, no diarrhea /constipation . No blood in stools. Weight is stable. No fevers. No irritative/obst voiding symptoms.      Review of Systems   Constitutional, HEENT, cardiovascular, pulmonary, GI, , musculoskeletal, neuro, skin, endocrine and psych systems are negative, except as otherwise noted.      Objective    /67   Pulse 69   Temp 98  F (36.7  C) (Tympanic)   Resp 20   Wt 79.8 kg (176 lb)   SpO2 95%   BMI 34.37 kg/m    Body mass index is 34.37 kg/m .  Physical Exam   Eye exam - right eye normal lid, conjunctiva, cornea, pupil and fundus, left eye normal lid, conjunctiva, cornea, pupil and fundus.  Thyroid not palpable, not enlarged, no nodules detected.  CHEST:chest clear to IPPA, no tachypnea, retractions or cyanosis and S1, S2 normal, no murmur, no gallop, rate regular.  Abdomen: general tenderness. Mass effect just lateral to a previous ventral scar (due to previous cholecystectomy). Query  a ventral hernia.   Ct result:  IMPRESSION:   1.  There is a large mass in the peritoneum that is inseparable from  the transverse colon and several small bowel loops. This could  represent a mucinous colonic malignancy or a primary peritoneal  carcinoma. Metastasis not excluded. There are several other peritoneal  nodules compatible with peritoneal carcinomatosis. There is also a  small amount of ascites and a trace right pleural effusion.  Alka was seen today for abdominal pain and dizziness.    Diagnoses and all orders for this visit:    Screening for hyperlipidemia  -     Urine Microscopic    Hypothyroidism due to Hashimoto's thyroiditis  -     TSH    Type 2 diabetes mellitus with stage 3a chronic kidney disease, without long-term current use of insulin (H)  -     REVIEW OF HEALTH MAINTENANCE PROTOCOL ORDERS  -     Albumin Random Urine Quantitative with Creat Ratio  -     HEMOGLOBIN A1C  -     FOOT EXAM    Abdominal pain, generalized  -     Comprehensive metabolic panel (BMP + Alb, Alk Phos, ALT, AST, Total. Bili, TP)  -     Lipase  -     *UA reflex to Microscopic and Culture ([facility] [facility] (except [place] and [place])  -     CBC with platelets and differential  -     CT Abdomen Pelvis w Contrast; Future    Postmenopausal status  -     DEXA HIP/PELVIS/SPINE - Future; Future      Advised supportive and symptomatic treatment.  Follow up with Provider - if condition persists or worsens.

## 2021-06-25 ENCOUNTER — TELEPHONE (OUTPATIENT)
Dept: FAMILY MEDICINE | Facility: CLINIC | Age: 86
End: 2021-06-25

## 2021-06-25 ENCOUNTER — PATIENT OUTREACH (OUTPATIENT)
Dept: ONCOLOGY | Facility: CLINIC | Age: 86
End: 2021-06-25

## 2021-06-25 NOTE — TELEPHONE ENCOUNTER
Reason for call:  Patient declined to state detailed information. Patient would like to speak to Cordelia regarding phone call on 6/24/2021. Please call patient to follow up.     Phone number to reach patient:  Home number on file [phone] (home)    Best Time:  Any time as soon as Cordelia is available     Can we leave a detailed message on this number?  NO

## 2021-06-25 NOTE — TELEPHONE ENCOUNTER
Alka cruz calling, says she called to schedule oncology referral but they could not see the referral?   I see referral to  medical oncology, phone:   [phone].   Patient says she was calling [phone].   She will try the number out of the referral now.    Es Sarah RN  [facility]

## 2021-06-25 NOTE — TELEPHONE ENCOUNTER
Alka is requesting we Please fax ct scan results from 6/24/21 to Dr Catrachito Badillo  [phone] (this is her pcp down in [place] for the past 30 yrs)

## 2021-06-25 NOTE — PROGRESS NOTES
I called pt's home/cell# [phone] and we discussed her referral to Oncology for CT scan showing the below.     She does require Oncologist for further work up. Will need tissue diagnosis as well. Onc is full the next 2 wks, they will review her case and add on next week. I will call back with appt options. Pt is video capable, she has smartphone with camera and devices (iPad, laptop) w camera. Pt has my direct # for call back as needed.    Pt lives in [place], but currently in [place], [place] with family for the time being.      CT Abd Pelvis 6/24/21:    IMPRESSION:   1.  There is a large mass in the peritoneum that is inseparable from  the transverse colon and several small bowel loops. This could  represent a mucinous colonic malignancy or a primary peritoneal  carcinoma. Metastasis not excluded. There are several other peritoneal  nodules compatible with peritoneal carcinomatosis. There is also a  small amount of ascites and a trace right pleural effusion.     2.  A message was left for the referring clinician at 12:42 PM on  6/24/2021.

## 2021-06-28 ENCOUNTER — PRE VISIT (OUTPATIENT)
Dept: ONCOLOGY | Facility: CLINIC | Age: 86
End: 2021-06-28

## 2021-06-28 ENCOUNTER — VIRTUAL VISIT (OUTPATIENT)
Dept: ONCOLOGY | Facility: CLINIC | Age: 86
End: 2021-06-28
Attending: PHYSICIAN ASSISTANT
Payer: MEDICARE

## 2021-06-28 DIAGNOSIS — C56.1 MALIGNANT NEOPLASM OF RIGHT OVARY (H): ICD-10-CM

## 2021-06-28 DIAGNOSIS — D37.9 NEOPLASM OF UNCERTAIN BEHAVIOR OF DIGESTIVE ORGAN, UNSPECIFIED: ICD-10-CM

## 2021-06-28 DIAGNOSIS — R19.00 ABDOMINAL MASS, UNSPECIFIED ABDOMINAL LOCATION: ICD-10-CM

## 2021-06-28 DIAGNOSIS — C48.2 MALIGNANT NEOPLASM OF PERITONEUM, UNSPECIFIED (H): ICD-10-CM

## 2021-06-28 PROCEDURE — 999N001193 HC VIDEO/TELEPHONE VISIT; NO CHARGE

## 2021-06-28 PROCEDURE — 99205 OFFICE O/P NEW HI 60 MIN: CPT | Mod: 95 | Performed by: INTERNAL MEDICINE

## 2021-06-28 NOTE — PROGRESS NOTES
"Alka is a 85 year old who is being evaluated via a billable video visit.      How would you like to obtain your AVS? Mail a copy  If the video visit is dropped, the invitation should be resent by: Text to cell phone: [phone]  Will anyone else be joining your video visit? No       Sivan Head CMA ([facility])      Video Start Time: 2:15  Video-Visit Details    Type of service:  Video Visit    Video End Time:2:52 PM    Originating Location (pt. Location): Home    Distant Location (provider location):  [facility]     Platform used for Video Visit: MelWell    CC:  Abdominal mass    HPI:    The patient is an 85 year old woman who presented last week to her PCP with 1 month of dull abdominal pain.  There was no radiation of the pain.  No fevers or chills. The patient did have bloating and gas.  She had some nausea but no vomiting.  No constipation was noted.  No dysuria or hematuria.  UA was unremarkable.  CBC demonstrated no leukocytosis and no anemia.  A CT CAP was done which showed \"a large mass in the peritoneum that is inseparable from the transverse colon and several small bowel loops. This could represent a mucinous colonic malignancy or a primary peritoneal carcinoma. Metastasis not excluded. There are several other peritoneal nodules compatible with peritoneal carcinomatosis. There is also a small amount of ascites and a trace right pleural effusion.\"    The patient continues to have abdominal pain, but it has improved.  She has no nausea now, and her BM's are regular.    The patient has had regular colonoscopies, most recently 4 years ago with no polyps.  She is not anemic on CBC.  The patient had a hysterectomy 50 years ago, but her ovaries were not removed.  She is clear and certain of this history.    I reviewed the films with Dr. Lanza and he concurs with obtaining a bx.  I will arrange for an IR bx as well as tumor marker studies.    The patient wishes to return home to [facility] [address].  We agreed to complete the workup here and then she will decide regarding returning to [place] for further treatment.    ROS:    10 point ROS is non-contributory except as above.    LAB:    Lipase: 90    TSH: 2.28    UA RESULTS:      Lab Test 06/24/21  1041   COLOR Yellow   APPEARANCE Clear   URINEGLC Negative   URINEBILI Negative   URINEKETONE Negative   SG 1.020   UBLD Negative   URINEPH 6.0   PROTEIN Negative   UROBILINOGEN 0.2   NITRITE Negative   LEUKEST Trace*   RBCU O - 2   WBCU 0 - 5     CBC RESULTS:       Lab Test 06/24/21  1045   WBC 9.4   RBC 4.29   HGB 12.1   HCT 37.7   MCV 88   MCH 28.2   MCHC 32.1   RDW 13.4        Last Comprehensive Metabolic Panel:  Sodium   Date Value Ref Range Status   06/24/2021 136 133 - 144 mmol/L Final     Potassium   Date Value Ref Range Status   06/24/2021 5.0 3.4 - 5.3 mmol/L Final     Chloride   Date Value Ref Range Status   06/24/2021 105 94 - 109 mmol/L Final     Carbon Dioxide   Date Value Ref Range Status   06/24/2021 26 20 - 32 mmol/L Final     Anion Gap   Date Value Ref Range Status   06/24/2021 5 3 - 14 mmol/L Final     Glucose   Date Value Ref Range Status   06/24/2021 145 (H) 70 - 99 mg/dL Final     Urea Nitrogen   Date Value Ref Range Status   06/24/2021 17 7 - 30 mg/dL Final     Creatinine   Date Value Ref Range Status   06/24/2021 0.96 0.52 - 1.04 mg/dL Final     GFR Estimate   Date Value Ref Range Status   06/24/2021 54 (L) >60 mL/min/[1.73_m2] Final     Comment:     Non  GFR Calc  Starting 12/18/2018, serum creatinine based estimated GFR (eGFR) will be   calculated using the Chronic Kidney Disease Epidemiology Collaboration   (CKD-EPI) equation.       Calcium   Date Value Ref Range Status   06/24/2021 9.1 8.5 - 10.1 mg/dL Final     Bilirubin Total   Date Value Ref Range Status   06/24/2021 0.3 0.2 - 1.3 mg/dL Final     Alkaline Phosphatase   Date Value Ref Range Status   06/24/2021 78 40 - 150 U/L Final     ALT   Date Value  Ref Range Status   06/24/2021 20 0 - 50 U/L Final     AST   Date Value Ref Range Status   06/24/2021 17 0 - 45 U/L Final     IMAGING:    Recent Results (from the past 744 hour(s))   CT Abdomen Pelvis w Contrast    Narrative    CT ABDOMEN AND PELVIS WITH CONTRAST  6/24/2021 12:19 PM    CLINICAL HISTORY: Abdominal pain, acute, nonlocalized, generalized.    TECHNIQUE: CT scan of the abdomen and pelvis was performed following  injection of IV contrast. Multiplanar reformats were obtained. Dose  reduction techniques were used.  CONTRAST: 99 mL Isovue-370    COMPARISON: None.    FINDINGS:   LOWER CHEST: Trace right pleural effusion. There is a partially cystic  mass in the upper pericardial fat that measures up to 2.3 cm (series  3, image 8).    HEPATOBILIARY: No focal liver lesions. The gallbladder is absent.    PANCREAS: Normal.    SPLEEN: Normal.    ADRENAL GLANDS: Subcentimeter left adrenal nodule is too small to  characterize but statistically likely to be benign. Normal right  adrenal gland.    KIDNEYS/BLADDER: No worrisome renal lesions or hydronephrosis. Urinary  bladder is unremarkable.    BOWEL: There is a large mass in the anterior abdomen that is  inseparable from the transverse colon but also appears to involve  several small bowel loops that measures 13.4 x 7.9 x 11.4 cm (series  3, image 114 and series 4, image 30). This lesion has cystic and solid  components and several areas of enhancement. There are several  satellite masses and nodules in the peritoneum including one within a  ventral hernia that measures up to 4.3 cm. There also appear to be  several metastatic nodules in the dependent pelvis. The appendix is  normal. Several sigmoid diverticula are present. Small amount of  ascites is present. Small hiatal hernia noted.    PELVIC ORGANS: Uterus not visualized.    ADDITIONAL FINDINGS: No pathologically enlarged abdominal or pelvic  lymph nodes.    MUSCULOSKELETAL: No destructive bone lesions.       Impression    IMPRESSION:   1.  There is a large mass in the peritoneum that is inseparable from  the transverse colon and several small bowel loops. This could  represent a mucinous colonic malignancy or a primary peritoneal  carcinoma. Metastasis not excluded. There are several other peritoneal  nodules compatible with peritoneal carcinomatosis. There is also a  small amount of ascites and a trace right pleural effusion.    2.  A message was left for the referring clinician at 12:42 PM on  6/24/2021.    SHEYLA GUPTA MD     MED:    Current Outpatient Medications   Medication     ALPRAZolam (XANAX) 0.25 MG tablet     celecoxib (CELEBREX) 200 MG capsule     cholecalciferol (VITAMIN D3) 25 mcg (1000 units) capsule     diltiazem (DILACOR XR) 180 MG 24 hr ER capsule     hydrochlorothiazide 12.5 MG TABS     Icosapent Ethyl (VASCEPA PO)     Levothyroxine Sodium 112 MCG CAPS     linagliptin (TRADJENTA) 5 MG TABS tablet     olmesartan (BENICAR) 20 MG tablet     pantoprazole sodium (PROTONIX) 40 MG packet     sertraline (ZOLOFT) 25 MG tablet     vitamin B-12 (CYANOCOBALAMIN) 1000 MCG tablet     No current facility-administered medications for this visit.      PMH:    Type 2 diabetes mellitus (HCC)       Hypertension       Dyspnea on exertion       Tear film insufficiency       Cataract       Choroidal nevus       GERD (gastroesophageal reflux disease)       Sleep apnea       Hypothyroidism       Obesity       RAOMNA (generalized anxiety disorder)       Social History:    Tobacco Use Types Packs/Day Years Used Date   Never Smoker           Smokeless Tobacco: Never Used           Alcohol Use Standard Drinks/Week Comments   Yes 0 (1 standard drink = 0.6 oz pure alcohol) Rare use     Sex Assigned at Birth Date Recorded   Not on file        Fam HX:    Family History   Problem Relation Age of Onset     Cancer Daughter      PE:    Vitals: There were no vitals taken for this visit.  BMI= There is no height or weight on file to  calculate BMI.     On this video visit the patient appeared well.  In no acute distress.    A/P:    1) Peritoneal cancer:  The patient has a peritoneal cancer.  This is unlikely to be colon cancer with recent negative colonoscopy, and no pancreatic mass is seen.  I have reviewed the case with Dr. Lal of [facility] Gyn Oncology service and he concurs that     A gyn malignancy, in particular ovarian cancer, is most likely.  He concurs with a bx at this time.  I will also obtain a CA-125, ca19-9, CEA, and AFP.  I will ask for a RTC next week or after the bx is completed.                                                                                                                                                                                med

## 2021-06-28 NOTE — TELEPHONE ENCOUNTER
RECORDS STATUS - ALL OTHER DIAGNOSIS      RECORDS RECEIVED FROM: [facility]   DATE RECEIVED: 6/28/2021   NOTES STATUS DETAILS   OFFICE NOTE from referring provider Complete Jayy Storey PA-C   OFFICE NOTE from medical oncologist N/A    DISCHARGE SUMMARY from hospital N/A    DISCHARGE REPORT from the ER     OPERATIVE REPORT N/A    MEDICATION LIST Complete [facility]   CLINICAL TRIAL TREATMENTS TO DATE     LABS     PATHOLOGY REPORTS N/A    ANYTHING RELATED TO DIAGNOSIS Complete Labs last updated on 6/24/2021   GENONOMIC TESTING     TYPE:     IMAGING (NEED IMAGES & REPORT)     CT SCANS Complete CT abdomen Pelvis 6/24/2021   MRI     MAMMO     ULTRASOUND     PET

## 2021-06-28 NOTE — LETTER
"    6/28/2021         RE: Alka Azul  [id number] [facility] [address] [facility] [address]        Dear Colleague,    Thank you for referring your patient, Alka Azul, to the [facility]. Please see a copy of my visit note below.    Alka is a 85 year old who is being evaluated via a billable video visit.      How would you like to obtain your AVS? Mail a copy  If the video visit is dropped, the invitation should be resent by: Text to cell phone: [phone]  Will anyone else be joining your video visit? No       Sivan Head CMA ([facility])      Video Start Time: 2:15  Video-Visit Details    Type of service:  Video Visit    Video End Time:2:52 PM    Originating Location (pt. Location): Home    Distant Location (provider location):  [facility]     Platform used for Video Visit: No.1 Traveller    CC:  Abdominal mass    HPI:    The patient is an 85 year old woman who presented last week to her PCP with 1 month of dull abdominal pain.  There was no radiation of the pain.  No fevers or chills. The patient did have bloating and gas.  She had some nausea but no vomiting.  No constipation was noted.  No dysuria or hematuria.  UA was unremarkable.  CBC demonstrated no leukocytosis and no anemia.  A CT CAP was done which showed \"a large mass in the peritoneum that is inseparable from the transverse colon and several small bowel loops. This could represent a mucinous colonic malignancy or a primary peritoneal carcinoma. Metastasis not excluded. There are several other peritoneal nodules compatible with peritoneal carcinomatosis. There is also a small amount of ascites and a trace right pleural effusion.\"    The patient continues to have abdominal pain, but it has improved.  She has no nausea now, and her BM's are regular.    The patient has had regular colonoscopies, most recently 4 years ago with no polyps.  She is not anemic on CBC.  The patient had a hysterectomy 50 years ago, but " her ovaries were not removed.  She is clear and certain of this history.    I reviewed the films with Dr. Lanza and he concurs with obtaining a bx.  I will arrange for an IR bx as well as tumor marker studies.    The patient wishes to return home to [place], [place].  We agreed to complete the workup here and then she will decide regarding returning to [place] for further treatment.    ROS:    10 point ROS is non-contributory except as above.    LAB:    Lipase: 90    TSH: 2.28    UA RESULTS:      Lab Test 06/24/21  1041   COLOR Yellow   APPEARANCE Clear   URINEGLC Negative   URINEBILI Negative   URINEKETONE Negative   SG 1.020   UBLD Negative   URINEPH 6.0   PROTEIN Negative   UROBILINOGEN 0.2   NITRITE Negative   LEUKEST Trace*   RBCU O - 2   WBCU 0 - 5     CBC RESULTS:       Lab Test 06/24/21  1045   WBC 9.4   RBC 4.29   HGB 12.1   HCT 37.7   MCV 88   MCH 28.2   MCHC 32.1   RDW 13.4        Last Comprehensive Metabolic Panel:  Sodium   Date Value Ref Range Status   06/24/2021 136 133 - 144 mmol/L Final     Potassium   Date Value Ref Range Status   06/24/2021 5.0 3.4 - 5.3 mmol/L Final     Chloride   Date Value Ref Range Status   06/24/2021 105 94 - 109 mmol/L Final     Carbon Dioxide   Date Value Ref Range Status   06/24/2021 26 20 - 32 mmol/L Final     Anion Gap   Date Value Ref Range Status   06/24/2021 5 3 - 14 mmol/L Final     Glucose   Date Value Ref Range Status   06/24/2021 145 (H) 70 - 99 mg/dL Final     Urea Nitrogen   Date Value Ref Range Status   06/24/2021 17 7 - 30 mg/dL Final     Creatinine   Date Value Ref Range Status   06/24/2021 0.96 0.52 - 1.04 mg/dL Final     GFR Estimate   Date Value Ref Range Status   06/24/2021 54 (L) >60 mL/min/[1.73_m2] Final     Comment:     Non  GFR Calc  Starting 12/18/2018, serum creatinine based estimated GFR (eGFR) will be   calculated using the Chronic Kidney Disease Epidemiology Collaboration   (CKD-EPI) equation.       Calcium   Date Value  Ref Range Status   06/24/2021 9.1 8.5 - 10.1 mg/dL Final     Bilirubin Total   Date Value Ref Range Status   06/24/2021 0.3 0.2 - 1.3 mg/dL Final     Alkaline Phosphatase   Date Value Ref Range Status   06/24/2021 78 40 - 150 U/L Final     ALT   Date Value Ref Range Status   06/24/2021 20 0 - 50 U/L Final     AST   Date Value Ref Range Status   06/24/2021 17 0 - 45 U/L Final     IMAGING:    Recent Results (from the past 744 hour(s))   CT Abdomen Pelvis w Contrast    Narrative    CT ABDOMEN AND PELVIS WITH CONTRAST  6/24/2021 12:19 PM    CLINICAL HISTORY: Abdominal pain, acute, nonlocalized, generalized.    TECHNIQUE: CT scan of the abdomen and pelvis was performed following  injection of IV contrast. Multiplanar reformats were obtained. Dose  reduction techniques were used.  CONTRAST: 99 mL Isovue-370    COMPARISON: None.    FINDINGS:   LOWER CHEST: Trace right pleural effusion. There is a partially cystic  mass in the upper pericardial fat that measures up to 2.3 cm (series  3, image 8).    HEPATOBILIARY: No focal liver lesions. The gallbladder is absent.    PANCREAS: Normal.    SPLEEN: Normal.    ADRENAL GLANDS: Subcentimeter left adrenal nodule is too small to  characterize but statistically likely to be benign. Normal right  adrenal gland.    KIDNEYS/BLADDER: No worrisome renal lesions or hydronephrosis. Urinary  bladder is unremarkable.    BOWEL: There is a large mass in the anterior abdomen that is  inseparable from the transverse colon but also appears to involve  several small bowel loops that measures 13.4 x 7.9 x 11.4 cm (series  3, image 114 and series 4, image 30). This lesion has cystic and solid  components and several areas of enhancement. There are several  satellite masses and nodules in the peritoneum including one within a  ventral hernia that measures up to 4.3 cm. There also appear to be  several metastatic nodules in the dependent pelvis. The appendix is  normal. Several sigmoid diverticula are  present. Small amount of  ascites is present. Small hiatal hernia noted.    PELVIC ORGANS: Uterus not visualized.    ADDITIONAL FINDINGS: No pathologically enlarged abdominal or pelvic  lymph nodes.    MUSCULOSKELETAL: No destructive bone lesions.      Impression    IMPRESSION:   1.  There is a large mass in the peritoneum that is inseparable from  the transverse colon and several small bowel loops. This could  represent a mucinous colonic malignancy or a primary peritoneal  carcinoma. Metastasis not excluded. There are several other peritoneal  nodules compatible with peritoneal carcinomatosis. There is also a  small amount of ascites and a trace right pleural effusion.    2.  A message was left for the referring clinician at 12:42 PM on  6/24/2021.    SHEYLA GUPTA MD     MED:    Current Outpatient Medications   Medication     ALPRAZolam (XANAX) 0.25 MG tablet     celecoxib (CELEBREX) 200 MG capsule     cholecalciferol (VITAMIN D3) 25 mcg (1000 units) capsule     diltiazem (DILACOR XR) 180 MG 24 hr ER capsule     hydrochlorothiazide 12.5 MG TABS     Icosapent Ethyl (VASCEPA PO)     Levothyroxine Sodium 112 MCG CAPS     linagliptin (TRADJENTA) 5 MG TABS tablet     olmesartan (BENICAR) 20 MG tablet     pantoprazole sodium (PROTONIX) 40 MG packet     sertraline (ZOLOFT) 25 MG tablet     vitamin B-12 (CYANOCOBALAMIN) 1000 MCG tablet     No current facility-administered medications for this visit.      PMH:    Type 2 diabetes mellitus (HCC)       Hypertension       Dyspnea on exertion       Tear film insufficiency       Cataract       Choroidal nevus       GERD (gastroesophageal reflux disease)       Sleep apnea       Hypothyroidism       Obesity       RAMONA (generalized anxiety disorder)       Social History:    Tobacco Use Types Packs/Day Years Used Date   Never Smoker           Smokeless Tobacco: Never Used           Alcohol Use Standard Drinks/Week Comments   Yes 0 (1 standard drink = 0.6 oz pure alcohol) Rare use      Sex Assigned at Birth Date Recorded   Not on file        Fam HX:    Family History   Problem Relation Age of Onset     Cancer Daughter      PE:    Vitals: There were no vitals taken for this visit.  BMI= There is no height or weight on file to calculate BMI.     On this video visit the patient appeared well.  In no acute distress.    A/P:    1) Peritoneal cancer:  The patient has a peritoneal cancer.  This is unlikely to be colon cancer with recent negative colonoscopy, and no pancreatic mass is seen.  I have reviewed the case with Dr. 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  He concurs with a bx at this time.  I will also obtain a CA-125, ca19-9, CEA, and AFP.  I will ask for a RTC next week or after the bx is completed.                                                                                                                                                                                med              Again, thank you for allowing me to participate in the care of your patient.        Sincerely,        John Ortega MD

## 2021-06-29 ENCOUNTER — HOSPITAL ENCOUNTER (OUTPATIENT)
Facility: CLINIC | Age: 86
End: 2021-06-29
Admitting: INTERNAL MEDICINE
Payer: MEDICARE

## 2021-06-29 DIAGNOSIS — R10.84 ABDOMINAL PAIN, GENERALIZED: Primary | ICD-10-CM

## 2021-06-29 DIAGNOSIS — Z11.59 ENCOUNTER FOR SCREENING FOR OTHER VIRAL DISEASES: ICD-10-CM

## 2021-06-29 NOTE — PROGRESS NOTES
"    Outpatient IR Biopsy Referral    Alka is an 85 year old female who presented with 1 month of abdominal pain. She subsequently had a CT abdomen pelvis on 6/24/21 showing a large mass in the peritoneum that can't be seperarated from transverse colon and small bowel. Additionally, several other peritoneal nodules. Concern for peritoneal cancer, most likely gyn malignancy due to recent negative colonoscopy and no pancreatic mass. Request for IR biopsy to establish diagnosis.    Case and imaging was reviewed with Dr Osullivan from IR and biopsy of ventral abdominal mass under ultrasound guidance is recommended.  The abdominal mass can't be  from bowel on non contrast CT and is therefore not a safe target for IR biopsy.    Series 3 Image 83      Procedure order and surgical pathology order placed. Message sent to requesting team to inform IR of any additional diagnostics if needed.    Requesting provider, Dr. John Ortega.    Phuong Vitale PA-C  Interventional Radiology   IR on-call pager: [phone]    If requesting team would like sample sent for anything else please use the IR order set \"IR RAD Biopsy or Fluid Aspirate Specimens\" to select your necessary diagnostic labs and pend for admission. If there are labs you desire that are not found in this order set or you have questions regarding specific diagnostic labs please call the associated lab personnel. IR will not enter diagnostic labs on the day of the procedure.   "

## 2021-06-30 ENCOUNTER — PATIENT OUTREACH (OUTPATIENT)
Dept: ONCOLOGY | Facility: CLINIC | Age: 86
End: 2021-06-30

## 2021-06-30 ENCOUNTER — TELEPHONE (OUTPATIENT)
Dept: ONCOLOGY | Facility: CLINIC | Age: 86
End: 2021-06-30

## 2021-06-30 DIAGNOSIS — K66.8 MASS OF PERITONEUM: Primary | ICD-10-CM

## 2021-06-30 NOTE — PROGRESS NOTES
I spoke to pt, she states that she will be going back to [place] this Sunday (7/3). She is hoping we can get the peritoneal mass biopsy sooner. Bx on 7/21 is too far out.    [facility] says they can do this US guided bx on Fri 7/2 at 10am, check in by 9:45am. No special prep needed as no sedation will be used, just local anesthetic. I provided [facility] address, check in, lab information to pt. I will also fax notes to her PCP Dr Catrachito Badillo in [place] as requested.

## 2021-06-30 NOTE — TELEPHONE ENCOUNTER
I faxed the CT and Dr. Ortega note to [phone]    Heather Streeter MA

## 2021-07-02 ENCOUNTER — HOSPITAL ENCOUNTER (OUTPATIENT)
Dept: ULTRASOUND IMAGING | Facility: CLINIC | Age: 86
End: 2021-07-02
Attending: INTERNAL MEDICINE
Payer: MEDICARE

## 2021-07-02 ENCOUNTER — HOSPITAL ENCOUNTER (OUTPATIENT)
Dept: LAB | Facility: CLINIC | Age: 86
End: 2021-07-02
Attending: INTERNAL MEDICINE
Payer: MEDICARE

## 2021-07-02 VITALS — HEART RATE: 54 BPM | SYSTOLIC BLOOD PRESSURE: 143 MMHG | OXYGEN SATURATION: 97 % | DIASTOLIC BLOOD PRESSURE: 70 MMHG

## 2021-07-02 DIAGNOSIS — C48.2 MALIGNANT NEOPLASM OF PERITONEUM, UNSPECIFIED (H): ICD-10-CM

## 2021-07-02 DIAGNOSIS — R19.00 ABDOMINAL MASS, UNSPECIFIED ABDOMINAL LOCATION: ICD-10-CM

## 2021-07-02 DIAGNOSIS — C56.1 MALIGNANT NEOPLASM OF RIGHT OVARY (H): ICD-10-CM

## 2021-07-02 DIAGNOSIS — D37.9 NEOPLASM OF UNCERTAIN BEHAVIOR OF DIGESTIVE ORGAN, UNSPECIFIED: ICD-10-CM

## 2021-07-02 DIAGNOSIS — K66.8 MASS OF PERITONEUM: ICD-10-CM

## 2021-07-02 LAB
AFP SERPL-MCNC: 1.5 UG/L (ref 0–8)
CANCER AG125 SERPL-ACNC: 84 U/ML (ref 0–30)
CEA SERPL-MCNC: <0.5 UG/L (ref 0–2.5)

## 2021-07-02 PROCEDURE — 88342 IMHCHEM/IMCYTCHM 1ST ANTB: CPT | Mod: TC,XU | Performed by: INTERNAL MEDICINE

## 2021-07-02 PROCEDURE — 88305 TISSUE EXAM BY PATHOLOGIST: CPT | Mod: TC | Performed by: INTERNAL MEDICINE

## 2021-07-02 PROCEDURE — 88360 TUMOR IMMUNOHISTOCHEM/MANUAL: CPT | Mod: TC | Performed by: INTERNAL MEDICINE

## 2021-07-02 PROCEDURE — 88360 TUMOR IMMUNOHISTOCHEM/MANUAL: CPT | Mod: 26 | Performed by: PATHOLOGY

## 2021-07-02 PROCEDURE — 86304 IMMUNOASSAY TUMOR CA 125: CPT | Performed by: INTERNAL MEDICINE

## 2021-07-02 PROCEDURE — 88341 IMHCHEM/IMCYTCHM EA ADD ANTB: CPT | Mod: 26 | Performed by: PATHOLOGY

## 2021-07-02 PROCEDURE — 86301 IMMUNOASSAY TUMOR CA 19-9: CPT | Performed by: INTERNAL MEDICINE

## 2021-07-02 PROCEDURE — 82378 CARCINOEMBRYONIC ANTIGEN: CPT | Performed by: INTERNAL MEDICINE

## 2021-07-02 PROCEDURE — 88341 IMHCHEM/IMCYTCHM EA ADD ANTB: CPT | Mod: TC,XU | Performed by: INTERNAL MEDICINE

## 2021-07-02 PROCEDURE — 82105 ALPHA-FETOPROTEIN SERUM: CPT | Performed by: INTERNAL MEDICINE

## 2021-07-02 PROCEDURE — 36415 COLL VENOUS BLD VENIPUNCTURE: CPT | Performed by: INTERNAL MEDICINE

## 2021-07-02 PROCEDURE — 88342 IMHCHEM/IMCYTCHM 1ST ANTB: CPT | Mod: 26 | Performed by: PATHOLOGY

## 2021-07-02 PROCEDURE — 88305 TISSUE EXAM BY PATHOLOGIST: CPT | Mod: 26 | Performed by: PATHOLOGY

## 2021-07-02 PROCEDURE — 272N000431 US BIOPSY ABDOMEN SOFT TISSUE

## 2021-07-02 RX ORDER — LIDOCAINE HYDROCHLORIDE 10 MG/ML
10 INJECTION, SOLUTION EPIDURAL; INFILTRATION; INTRACAUDAL; PERINEURAL ONCE
Status: DISCONTINUED | OUTPATIENT
Start: 2021-07-02 | End: 2021-07-03 | Stop reason: HOSPADM

## 2021-07-02 NOTE — PROGRESS NOTES
Pt was here in Radiology for an abdominal mass  biopsy. Procedure was performed by Dr Demarco and pt tolerated procedure well. Vitals remained stable. Specimen to pathology for tesrting.  Discharge instructions reviewed and pt was discharged meeting all discharge criteria with cousin.

## 2021-07-06 LAB — COPATH REPORT: NORMAL

## 2021-07-07 ENCOUNTER — PATIENT OUTREACH (OUTPATIENT)
Dept: ONCOLOGY | Facility: CLINIC | Age: 86
End: 2021-07-07

## 2021-07-07 ENCOUNTER — TELEPHONE (OUTPATIENT)
Dept: ONCOLOGY | Facility: CLINIC | Age: 86
End: 2021-07-07

## 2021-07-07 LAB — LAB SCANNED RESULT: NORMAL

## 2021-07-07 NOTE — PROGRESS NOTES
I spoke to pt and we discussed her final pathology report on peritoneal mass biopsy collected 7/2. Per Dr Ortega, this is a gynecological cancer; Mullerian Cell Tumor, a rare cancer. Pt should see Gyn Onc in [place] to discuss treatment. Pt has moved back to [place] as of Sunday 7/4. She requests that I fax path report to PCP Dr Badillo so he can facilitate a referral to Gyn Onc. Pt had no further questions.

## 2021-07-07 NOTE — TELEPHONE ENCOUNTER
----- Message from Lux Pro RN sent at 7/7/2021  8:59 AM CDT -----  Regarding: Fax Path report today please!!  Hi team, could you please fax the items below to Dr Catrachito Badillo at fax# [phone], thank you!! They need these today so they can refer her to Gyn Onc in [place] where she is at right now.    # 7/2 Surgical Pathology report  # Labs: CEA, , AFP    Pa

## 2021-07-26 ENCOUNTER — TRANSFERRED RECORDS (OUTPATIENT)
Dept: HEALTH INFORMATION MANAGEMENT | Facility: CLINIC | Age: 86
End: 2021-07-26

## 2021-08-14 ENCOUNTER — HEALTH MAINTENANCE LETTER (OUTPATIENT)
Age: 86
End: 2021-08-14

## 2021-10-09 ENCOUNTER — HEALTH MAINTENANCE LETTER (OUTPATIENT)
Age: 86
End: 2021-10-09

## 2022-01-29 ENCOUNTER — HEALTH MAINTENANCE LETTER (OUTPATIENT)
Age: 87
End: 2022-01-29

## 2022-09-11 ENCOUNTER — HEALTH MAINTENANCE LETTER (OUTPATIENT)
Age: 87
End: 2022-09-11

## 2023-05-06 ENCOUNTER — HEALTH MAINTENANCE LETTER (OUTPATIENT)
Age: 88
End: 2023-05-06

## 2023-10-07 ENCOUNTER — HEALTH MAINTENANCE LETTER (OUTPATIENT)
Age: 88
End: 2023-10-07

## 2023-12-16 ENCOUNTER — HEALTH MAINTENANCE LETTER (OUTPATIENT)
Age: 88
End: 2023-12-16

## 2024-07-13 ENCOUNTER — HEALTH MAINTENANCE LETTER (OUTPATIENT)
Age: 89
End: 2024-07-13